# Patient Record
Sex: FEMALE | Race: WHITE | NOT HISPANIC OR LATINO | Employment: FULL TIME | URBAN - METROPOLITAN AREA
[De-identification: names, ages, dates, MRNs, and addresses within clinical notes are randomized per-mention and may not be internally consistent; named-entity substitution may affect disease eponyms.]

---

## 2024-08-16 ENCOUNTER — APPOINTMENT (OUTPATIENT)
Dept: PRIMARY CARE | Facility: CLINIC | Age: 44
End: 2024-08-16
Payer: COMMERCIAL

## 2024-08-16 VITALS
SYSTOLIC BLOOD PRESSURE: 138 MMHG | WEIGHT: 184 LBS | HEIGHT: 63 IN | HEART RATE: 110 BPM | TEMPERATURE: 98.4 F | OXYGEN SATURATION: 98 % | DIASTOLIC BLOOD PRESSURE: 92 MMHG | BODY MASS INDEX: 32.6 KG/M2

## 2024-08-16 DIAGNOSIS — Z13.29 SCREENING FOR HYPOTHYROIDISM: ICD-10-CM

## 2024-08-16 DIAGNOSIS — Z13.1 SCREENING FOR DIABETES MELLITUS: ICD-10-CM

## 2024-08-16 DIAGNOSIS — F41.9 ANXIETY: ICD-10-CM

## 2024-08-16 DIAGNOSIS — Z00.00 ENCOUNTER FOR MEDICAL EXAMINATION TO ESTABLISH CARE: ICD-10-CM

## 2024-08-16 DIAGNOSIS — Z12.31 ENCOUNTER FOR SCREENING MAMMOGRAM FOR MALIGNANT NEOPLASM OF BREAST: Primary | ICD-10-CM

## 2024-08-16 DIAGNOSIS — R03.0 ELEVATED BLOOD PRESSURE READING: ICD-10-CM

## 2024-08-16 RX ORDER — VILAZODONE HYDROCHLORIDE 20 MG/1
20 TABLET ORAL DAILY
Qty: 60 TABLET | Refills: 1 | Status: SHIPPED | OUTPATIENT
Start: 2024-08-16 | End: 2024-12-14

## 2024-08-16 ASSESSMENT — ENCOUNTER SYMPTOMS
FLANK PAIN: 0
ACTIVITY CHANGE: 0
FEVER: 0
PALPITATIONS: 0
APNEA: 0
DEPRESSION: 0
DIAPHORESIS: 0
CHILLS: 0
ABDOMINAL DISTENTION: 0
CONSTIPATION: 0
COUGH: 0
DIFFICULTY URINATING: 0
FREQUENCY: 0
PSYCHIATRIC NEGATIVE: 1
NEUROLOGICAL NEGATIVE: 1
CHEST TIGHTNESS: 0
ANAL BLEEDING: 0
ABDOMINAL PAIN: 0
FATIGUE: 0
APPETITE CHANGE: 0
MUSCULOSKELETAL NEGATIVE: 1
CHOKING: 0

## 2024-08-16 ASSESSMENT — PATIENT HEALTH QUESTIONNAIRE - PHQ9
2. FEELING DOWN, DEPRESSED OR HOPELESS: NOT AT ALL
1. LITTLE INTEREST OR PLEASURE IN DOING THINGS: NOT AT ALL
SUM OF ALL RESPONSES TO PHQ9 QUESTIONS 1 AND 2: 0

## 2024-08-16 NOTE — PROGRESS NOTES
"Subjective   Patient ID: Octavia Martinez is a 44 y.o. female who presents for New Patient Visit (New patient visit. General check up. Patient stated that she had a Rx for Vibryd in the past for anxiety. ).    HPI     A 44 yr old female presented today to establish care.  Patient does have history of anxiety for which she was prescribed Viibryd 20 mg daily which she takes as needed.  Anxiety symptoms improved in the summer and gets worse in the fall and winter.  Otherwise patient feels well with no chest pain, shortness of breath, cough, fever, nausea, vomiting.  She has started working from home and has gained 10 pounds in the past couple months.    Social history: She is non-smoker, drinks alcohol occasionally, no illicit drug use    Family history: Sister has end-stage renal disease waiting for transplant, dad has congestive heart failure A-fib and CAD, mom has RA and COPD.  Mammogram was done last year and was normal, Pap smear was done last year, she had abnormal uterine bleeding and Mirena IUD was placed        Review of Systems   Constitutional:  Negative for activity change, appetite change, chills, diaphoresis, fatigue and fever.   HENT:  Negative for congestion, dental problem, drooling, ear pain, hearing loss, mouth sores and nosebleeds.    Respiratory:  Negative for apnea, cough, choking and chest tightness.    Cardiovascular:  Negative for chest pain, palpitations and leg swelling.   Gastrointestinal:  Negative for abdominal distention, abdominal pain, anal bleeding and constipation.   Genitourinary:  Negative for difficulty urinating, flank pain and frequency.   Musculoskeletal: Negative.    Neurological: Negative.    Psychiatric/Behavioral: Negative.         Objective   BP (!) 138/92 (BP Location: Left arm, Patient Position: Sitting, BP Cuff Size: Adult)   Pulse 110   Temp 36.9 °C (98.4 °F) (Temporal)   Ht 1.6 m (5' 3\")   Wt 83.5 kg (184 lb)   SpO2 98%   BMI 32.59 kg/m²     Physical Exam  General: " Awake, alert/oriented x4, well developed, no acute distress  Head: Atraumatic/Normocephalic  Eyes: Normal external exam, EOMI, PERRLA  ENT: Oropharynx normal. Uvula midline, no tonsillar edema, moist mucous membranes  Cardiovascular: RRR, S1/S2, systolic murmur in the right lower sternal border, no rubs, or gallops, radial pulses +2, no edema of extremities  Pulmonary: CTAB, no respiratory distress. No wheezes, rales, or ronchi  Abdomen: +BS, soft, non-tender, nondistended, no guarding or rebound, no masses noted  MSK: No joint swelling, normal movements of all extremities. Range of motion- normal.  Extremities: FROM, no edema, wounds, or contusions  Skin- No lesions, contusions, or erythema.  Neuro: Alert/oriented x4, no focal motor or sensory deficits  Psychiatric: Judgment intact. Appropriate mood and behavior, appeared anxious    Assessment/Plan     1.  Anxiety, history of major depressive episode in the past: Refilled Viibryd 20 mg daily.    2.  Heart murmur: Stable, no symptoms of CHF    3.  Elevated blood pressure reading: Blood pressure was 138/92 heart rate was 110, repeated heart rate was 95 when patient relaxed.  Patient was counseled on exercise, DASH diet, weight loss and blood   pressure monitoring at home    4.  Health maintenance:  -Pap smear was done last year as per patient  -Due for mammogram, ordered  -Counseled regarding vaccinations  -Reinforced lifestyle modification  -Lipid panel was done last yr:normal  -Ordered CBC, CMP A1c and TSH

## 2024-09-07 ENCOUNTER — LAB (OUTPATIENT)
Dept: LAB | Facility: LAB | Age: 44
End: 2024-09-07
Payer: COMMERCIAL

## 2024-09-07 DIAGNOSIS — Z13.29 SCREENING FOR HYPOTHYROIDISM: ICD-10-CM

## 2024-09-07 DIAGNOSIS — Z00.00 ENCOUNTER FOR MEDICAL EXAMINATION TO ESTABLISH CARE: ICD-10-CM

## 2024-09-07 DIAGNOSIS — Z13.1 SCREENING FOR DIABETES MELLITUS: ICD-10-CM

## 2024-09-07 LAB
ALBUMIN SERPL BCP-MCNC: 4.1 G/DL (ref 3.4–5)
ALP SERPL-CCNC: 35 U/L (ref 33–110)
ALT SERPL W P-5'-P-CCNC: 14 U/L (ref 7–45)
ANION GAP SERPL CALC-SCNC: 10 MMOL/L (ref 10–20)
AST SERPL W P-5'-P-CCNC: 10 U/L (ref 9–39)
BASOPHILS # BLD AUTO: 0.03 X10*3/UL (ref 0–0.1)
BASOPHILS NFR BLD AUTO: 0.6 %
BILIRUB SERPL-MCNC: 0.6 MG/DL (ref 0–1.2)
BUN SERPL-MCNC: 18 MG/DL (ref 6–23)
CALCIUM SERPL-MCNC: 8.8 MG/DL (ref 8.6–10.3)
CHLORIDE SERPL-SCNC: 105 MMOL/L (ref 98–107)
CO2 SERPL-SCNC: 27 MMOL/L (ref 21–32)
COTININE UR QL SCN: NEGATIVE
CREAT SERPL-MCNC: 0.65 MG/DL (ref 0.5–1.05)
EGFRCR SERPLBLD CKD-EPI 2021: >90 ML/MIN/1.73M*2
EOSINOPHIL # BLD AUTO: 0.08 X10*3/UL (ref 0–0.7)
EOSINOPHIL NFR BLD AUTO: 1.5 %
ERYTHROCYTE [DISTWIDTH] IN BLOOD BY AUTOMATED COUNT: 12.7 % (ref 11.5–14.5)
EST. AVERAGE GLUCOSE BLD GHB EST-MCNC: 68 MG/DL
GLUCOSE SERPL-MCNC: 95 MG/DL (ref 74–99)
HBA1C MFR BLD: 4 %
HCT VFR BLD AUTO: 39.2 % (ref 36–46)
HGB BLD-MCNC: 13 G/DL (ref 12–16)
IMM GRANULOCYTES # BLD AUTO: 0.02 X10*3/UL (ref 0–0.7)
IMM GRANULOCYTES NFR BLD AUTO: 0.4 % (ref 0–0.9)
LYMPHOCYTES # BLD AUTO: 1.5 X10*3/UL (ref 1.2–4.8)
LYMPHOCYTES NFR BLD AUTO: 28.1 %
MCH RBC QN AUTO: 29.4 PG (ref 26–34)
MCHC RBC AUTO-ENTMCNC: 33.2 G/DL (ref 32–36)
MCV RBC AUTO: 89 FL (ref 80–100)
MONOCYTES # BLD AUTO: 0.45 X10*3/UL (ref 0.1–1)
MONOCYTES NFR BLD AUTO: 8.4 %
NEUTROPHILS # BLD AUTO: 3.26 X10*3/UL (ref 1.2–7.7)
NEUTROPHILS NFR BLD AUTO: 61 %
NRBC BLD-RTO: 0 /100 WBCS (ref 0–0)
PLATELET # BLD AUTO: 201 X10*3/UL (ref 150–450)
POTASSIUM SERPL-SCNC: 4.2 MMOL/L (ref 3.5–5.3)
PROT SERPL-MCNC: 6.1 G/DL (ref 6.4–8.2)
RBC # BLD AUTO: 4.42 X10*6/UL (ref 4–5.2)
SODIUM SERPL-SCNC: 138 MMOL/L (ref 136–145)
TSH SERPL-ACNC: 2.27 MIU/L (ref 0.44–3.98)
WBC # BLD AUTO: 5.3 X10*3/UL (ref 4.4–11.3)

## 2024-09-07 PROCEDURE — 85025 COMPLETE CBC W/AUTO DIFF WBC: CPT

## 2024-09-07 PROCEDURE — 83036 HEMOGLOBIN GLYCOSYLATED A1C: CPT

## 2024-09-07 PROCEDURE — 84443 ASSAY THYROID STIM HORMONE: CPT

## 2024-09-07 PROCEDURE — 80053 COMPREHEN METABOLIC PANEL: CPT

## 2024-09-07 PROCEDURE — 36415 COLL VENOUS BLD VENIPUNCTURE: CPT

## 2024-09-16 ENCOUNTER — HOSPITAL ENCOUNTER (OUTPATIENT)
Dept: RADIOLOGY | Facility: HOSPITAL | Age: 44
Discharge: HOME | End: 2024-09-16
Payer: COMMERCIAL

## 2024-09-16 VITALS — BODY MASS INDEX: 32.25 KG/M2 | WEIGHT: 182 LBS | HEIGHT: 63 IN

## 2024-09-16 DIAGNOSIS — Z12.31 ENCOUNTER FOR SCREENING MAMMOGRAM FOR MALIGNANT NEOPLASM OF BREAST: ICD-10-CM

## 2024-09-16 PROCEDURE — 77067 SCR MAMMO BI INCL CAD: CPT

## 2024-10-02 ENCOUNTER — HOSPITAL ENCOUNTER (OUTPATIENT)
Dept: RADIOLOGY | Facility: EXTERNAL LOCATION | Age: 44
Discharge: HOME | End: 2024-10-02

## 2025-03-28 ENCOUNTER — PATIENT OUTREACH (OUTPATIENT)
Dept: CARE COORDINATION | Facility: CLINIC | Age: 45
End: 2025-03-28
Payer: COMMERCIAL

## 2025-03-28 NOTE — PROGRESS NOTES
Wrap Up  Wrap Up Additional Comments: Octavia reports she is doing OK, does have some pain,  but not needing to take the oxy.  She is up and about, eating and drinking, voiding but only small BM yesterday, is passing gas.  She is aware of all the s/s infection, has f/u at [facility] in 2 weeks. (3/28/2025 10:00 AM)    Medications  Medications reviewed with patient/caregiver?: Yes (3/28/2025 10:00 AM)  Is the patient having any side effects they believe may be caused by any medication additions or changes?: No (3/28/2025 10:00 AM)  Does the patient have all medications ordered at discharge?: Yes (3/28/2025 10:00 AM)  Care Management Interventions: No intervention needed (3/28/2025 10:00 AM)  Is the patient taking all medications as directed (includes completed medication regime)?: Yes (3/28/2025 10:00 AM)    Appointments  Does the patient have a primary care provider?: Yes (following up with sx) (3/28/2025 10:00 AM)  Care Management Interventions: Verified appointment date/time/provider (3/28/2025 10:00 AM)    Patient Teaching  Does the patient have access to their discharge instructions?: Yes (3/28/2025 10:00 AM)  Care Management Interventions: Reviewed instructions with patient (3/28/2025 10:00 AM)  What is the patient's perception of their health status since discharge?: Improving (3/28/2025 10:00 AM)      Darline Mathews RN [facility]  [phone]

## 2025-04-15 ENCOUNTER — PATIENT OUTREACH (OUTPATIENT)
Dept: CARE COORDINATION | Facility: CLINIC | Age: 45
End: 2025-04-15
Payer: COMMERCIAL

## 2025-04-15 NOTE — PROGRESS NOTES
[facility] ANJELICA follow up:  Octavia is doing well, has no pain at rest, some with movement.  Has her follow up sx appts tomorrow, is planning to RTW this Thursday remotely.  She did inform this CM that [facility] denied her STD as not medically ncessary, even though she donated her kidney to her sister    Darline Mathews RN [facility]  [phone]

## 2025-05-06 ENCOUNTER — APPOINTMENT (OUTPATIENT)
Dept: PRIMARY CARE | Facility: CLINIC | Age: 45
End: 2025-05-06
Payer: COMMERCIAL

## 2025-05-06 VITALS
SYSTOLIC BLOOD PRESSURE: 140 MMHG | HEART RATE: 75 BPM | TEMPERATURE: 97.6 F | WEIGHT: 190 LBS | BODY MASS INDEX: 34.96 KG/M2 | HEIGHT: 62 IN | DIASTOLIC BLOOD PRESSURE: 70 MMHG

## 2025-05-06 DIAGNOSIS — Z00.00 ANNUAL WELLNESS VISIT: ICD-10-CM

## 2025-05-06 DIAGNOSIS — F41.9 ANXIETY: Primary | ICD-10-CM

## 2025-05-06 DIAGNOSIS — I10 PRIMARY HYPERTENSION: ICD-10-CM

## 2025-05-06 DIAGNOSIS — Z12.31 ENCOUNTER FOR SCREENING MAMMOGRAM FOR MALIGNANT NEOPLASM OF BREAST: ICD-10-CM

## 2025-05-06 PROCEDURE — 99213 OFFICE O/P EST LOW 20 MIN: CPT

## 2025-05-06 PROCEDURE — 99396 PREV VISIT EST AGE 40-64: CPT

## 2025-05-06 PROCEDURE — 3008F BODY MASS INDEX DOCD: CPT

## 2025-05-06 PROCEDURE — 1036F TOBACCO NON-USER: CPT

## 2025-05-06 PROCEDURE — 3078F DIAST BP <80 MM HG: CPT

## 2025-05-06 PROCEDURE — 3077F SYST BP >= 140 MM HG: CPT

## 2025-05-06 RX ORDER — HYDROXYZINE HYDROCHLORIDE 25 MG/1
25 TABLET, FILM COATED ORAL EVERY 8 HOURS PRN
Qty: 60 TABLET | Refills: 0 | Status: SHIPPED | OUTPATIENT
Start: 2025-05-06 | End: 2025-06-05

## 2025-05-06 ASSESSMENT — ENCOUNTER SYMPTOMS
ACTIVITY CHANGE: 0
ABDOMINAL DISTENTION: 0
NEUROLOGICAL NEGATIVE: 1
MUSCULOSKELETAL NEGATIVE: 1
CHOKING: 0
APNEA: 0
COUGH: 0
APPETITE CHANGE: 0
DIAPHORESIS: 0
FEVER: 0
FLANK PAIN: 0
CONSTIPATION: 0
ABDOMINAL PAIN: 0
FREQUENCY: 0
ANAL BLEEDING: 0
PSYCHIATRIC NEGATIVE: 1
FATIGUE: 0
PALPITATIONS: 0
DIFFICULTY URINATING: 0
CHEST TIGHTNESS: 0
CHILLS: 0

## 2025-05-06 ASSESSMENT — PATIENT HEALTH QUESTIONNAIRE - PHQ9
SUM OF ALL RESPONSES TO PHQ9 QUESTIONS 1 AND 2: 0
2. FEELING DOWN, DEPRESSED OR HOPELESS: NOT AT ALL
1. LITTLE INTEREST OR PLEASURE IN DOING THINGS: NOT AT ALL

## 2025-05-06 NOTE — PROGRESS NOTES
Subjective   Patient ID: Octavia Martinez is a 44 y.o. female who presents for Annual Exam (Pt would like to talk about medication options).    HPI     A 44 yr old female presented today for annual wellness visit and follow-up.  Patient has history of anxiety, she has stopped Viibryd.  Stated that symptoms are not severe.  Symptoms get worse in the fall and winter improved in the summer.      Otherwise patient feels well with no chest pain, shortness of breath, cough, fever, nausea, vomiting.  She has started working from home and has gained 10 pounds in the past couple months.  6 weeks ago she donated her kidney to her sister.  Has been feeling well since.  She is following at [facility].  Last kidney function was done in April 2025 and was normal.    Social history: She is non-smoker, drinks alcohol occasionally, no illicit drug use    Family history: Sister has end-stage renal disease s/p dad has congestive heart failure A-fib and CAD, mom has RA and COPD.  Mammogram was done last year and was normal, Pap smear was done last year, she had abnormal uterine bleeding and Mirena IUD was placed    Few stitches did not follow-up since after surgery 6 weeks ago.  Will remove today    Review of Systems   Constitutional:  Negative for activity change, appetite change, chills, diaphoresis, fatigue and fever.   HENT:  Negative for congestion, dental problem, drooling, ear pain, hearing loss, mouth sores and nosebleeds.    Respiratory:  Negative for apnea, cough, choking and chest tightness.    Cardiovascular:  Negative for chest pain, palpitations and leg swelling.   Gastrointestinal:  Negative for abdominal distention, abdominal pain, anal bleeding and constipation.   Genitourinary:  Negative for difficulty urinating, flank pain and frequency.   Musculoskeletal: Negative.    Neurological: Negative.    Psychiatric/Behavioral: Negative.         Objective   /70 (BP Location: Left arm, Patient Position: Sitting, BP Cuff Size:  "Large adult)   Pulse 75   Temp 36.4 °C (97.6 °F) (Temporal)   Ht 1.575 m (5' 2\")   Wt 86.2 kg (190 lb)   BMI 34.75 kg/m²     Physical Exam  General: Awake, alert/oriented x4, well developed, no acute distress  Head: Atraumatic/Normocephalic  Eyes: Normal external exam, EOMI, PERRLA  ENT: Oropharynx normal. Uvula midline, no tonsillar edema, moist mucous membranes  Cardiovascular: RRR, S1/S2, systolic murmur in the right lower sternal border, no rubs, or gallops, radial pulses +2, no edema of extremities  Pulmonary: CTAB, no respiratory distress. No wheezes, rales, or ronchi  Abdomen: +BS, soft, non-tender, nondistended, no guarding or rebound, no masses noted  MSK: No joint swelling, normal movements of all extremities. Range of motion- normal.  Extremities: FROM, no edema, wounds, or contusions  Skin- No lesions, contusions, or erythema.  Neuro: Alert/oriented x4, no focal motor or sensory deficits  Psychiatric: Judgment intact. Appropriate mood and behavior, appeared anxious    Assessment/Plan     1.  Anxiety, history of major depressive episode in the past: Currently feels better.  Will start her on hydroxyzine 25 mg every 8 hours as needed.    2.  Heart murmur: Stable, no symptoms of CHF    3.  Elevated blood pressure reading:  She has 24-hour blood pressure monitoring through [facility] and was normal.  Blood pressure readings at home are better controlled.  She was advised regarding DASH diet and exercise.  Patient stated that she has gained some weight and has been working on changing diet and regular exercise.  Blood pressure today is 140/70      4.  Health maintenance:  -Pap smear was done last year as per patient  - Breast cancer screening: Mammogram was done in October 2024 and was normal ordered the new 1  -Reinforced lifestyle modification       -2 stitches were removed at today's visit  "